# Patient Record
Sex: MALE | NOT HISPANIC OR LATINO | URBAN - METROPOLITAN AREA
[De-identification: names, ages, dates, MRNs, and addresses within clinical notes are randomized per-mention and may not be internally consistent; named-entity substitution may affect disease eponyms.]

---

## 2019-01-30 ENCOUNTER — COMMUNICATION - HEALTHEAST (OUTPATIENT)
Dept: TELEHEALTH | Facility: CLINIC | Age: 34
End: 2019-01-30

## 2019-01-30 ENCOUNTER — OFFICE VISIT - HEALTHEAST (OUTPATIENT)
Dept: INTERNAL MEDICINE | Facility: CLINIC | Age: 34
End: 2019-01-30

## 2019-01-30 DIAGNOSIS — R00.2 PALPITATIONS: ICD-10-CM

## 2019-01-30 ASSESSMENT — MIFFLIN-ST. JEOR: SCORE: 2060.05

## 2021-06-02 VITALS — WEIGHT: 222.8 LBS | BODY MASS INDEX: 26.31 KG/M2 | HEIGHT: 77 IN

## 2021-06-16 PROBLEM — R00.2 PALPITATIONS: Status: ACTIVE | Noted: 2019-01-30

## 2021-06-23 NOTE — PROGRESS NOTES
ASSESSMENT AND PLAN:    1. Palpitations  Exam and history and recent evaluations with echocardiogram and lab testing, and EKG's all suggest a benign cause.  Not likely SVT, more likely mild panic anxiety or heightened body awareness related to anxiety.  Reassured.  No further testing appears to be indicated.  We talked about using beta blocker therapy and/or SSRI anti-anxiety therapy. He tolerates physical activity without significant other symptoms or limitations.  He wants to 'do research' and consider with time whether he wants to have treatment.  Will follow up prn.     CHIEF COMPLAINT:  Chief Complaint   Patient presents with     [facility]     HISTORY OF PRESENT ILLNESS:  Elvis Louis is a 33 y.o. male with sense of fast pulse during exertion.  He indicates a pulse at these times around 110-120 and regular. Does not have chest pain, and does feel mild dyspnea.  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  He was an athlete in high school and had no head trauma or syncope or any breathing difficulties or history of asthma.  Has otherwise been well. Does feel some anxiety.  No excess alcohol or stimulant drug use.  Has stopped caffeine. He has been using an OTC medication that has some THC in it. Denies symptoms of depression but doesn't need much sleep and can have some difficulty falling asleep.        REVIEW OF SYSTEMS:   See HPI, all other systems on review are negative.    Past Medical History:   Diagnosis Date     Palpitations      Social History     Socioeconomic History     Marital status: Single     Spouse name: Not on file     Number of children: Not on file     Years of education: Not on file     Highest education level: Not on file   Social Needs     Financial resource strain: Not on file     Food insecurity - worry: Not on file     Food insecurity - inability: Not on file     Transportation needs - medical: Not on file     Transportation needs - non-medical: Not  "on file   Occupational History     Not on file   Tobacco Use     Smoking status: Never Smoker     Smokeless tobacco: Never Used   Substance and Sexual Activity     Alcohol use: Not on file     Drug use: Not on file     Sexual activity: Not on file   Other Topics Concern     Not on file   Social History Narrative    .       Family History   Problem Relation Age of Onset     Heart attack Father      Other Father         polio      Past Surgical History:   Procedure Laterality Date     NO PAST SURGERIES       VITALS:  Vitals:    01/30/19 1143   BP: 128/72   Patient Site: Left Arm   Patient Position: Sitting   Cuff Size: Adult Regular   Pulse: (!) 102   Weight: (!) 222 lb 12.8 oz (101.1 kg)   Height: 6' 4.5\" (1.943 m)     Wt Readings from Last 3 Encounters:   01/30/19 (!) 222 lb 12.8 oz (101.1 kg)     PHYSICAL EXAM:  Constitutional:  In NAD, alert and oriented  Neck:  Supple, no significant adenopathy.  Cardiac:  S1 S2 without murmur, rhythm regular  Lungs: Clear to auscultation  Abdomen:   Soft, flat and non-tender, without guarding, rebound, or mass.    Psychiatric:  Mood appropriate, memory intact, thinking is clear.     DECISION TO OBTAIN OLD RECORDS AND/OR OBTAIN HISTORY FROM SOMEONE OTHER THAN PATIENT, AND/OR ACCESSING CARE EVERYWHERE):  1  Reviewed care everywhere evaluations of palpitations     REVIEW AND SUMMARIZATION OF OLD RECORDS, AND/OR OBTAINING HISTORY FROM SOMEONE OTHER THAN PATIENT, AND/OR DISCUSSION OF CASE WITH ANOTHER HEALTH CARE PROVIDER:  2 0    REVIEW AND/OR ORDER OF OF CLINICAL LAB TESTS: 1  Lab testing done previously.    REVIEW AND/OR ORDER OF RADIOLOGY TESTS: 1 echocardiogram .    REVIEW AND/OR ORDER OF MEDICAL TESTS (EKG/ECHO/COLONOSCOPY/EGD): 1  Reviewed past EKG interpretations    INDEPENDENT  VISUALIZATION OF IMAGE, TRACING, OR SPECIMEN ITSELF (2 EACH):  0     TOTAL: 3    Current Outpatient Medications   Medication Sig Dispense Refill     UNABLE TO FIND HEMPMed Name:   "     No current facility-administered medications for this visit.      Isrrael Briggs MD  Internal Medicine  [facility]